# Patient Record
Sex: FEMALE | Race: WHITE | URBAN - METROPOLITAN AREA
[De-identification: names, ages, dates, MRNs, and addresses within clinical notes are randomized per-mention and may not be internally consistent; named-entity substitution may affect disease eponyms.]

---

## 2017-08-14 ENCOUNTER — IMPORTED ENCOUNTER (OUTPATIENT)
Dept: URBAN - METROPOLITAN AREA CLINIC 50 | Facility: CLINIC | Age: 77
End: 2017-08-14

## 2017-08-14 NOTE — PATIENT DISCUSSION
"""Recommended warm compresses 2-4 times a day.  Patient instructed to test temperature of the warm compress before applying to skin to avoid burns."""

## 2017-10-03 ENCOUNTER — IMPORTED ENCOUNTER (OUTPATIENT)
Dept: URBAN - METROPOLITAN AREA CLINIC 50 | Facility: CLINIC | Age: 77
End: 2017-10-03

## 2017-10-16 ENCOUNTER — IMPORTED ENCOUNTER (OUTPATIENT)
Dept: URBAN - METROPOLITAN AREA CLINIC 50 | Facility: CLINIC | Age: 77
End: 2017-10-16

## 2017-10-25 ENCOUNTER — IMPORTED ENCOUNTER (OUTPATIENT)
Dept: URBAN - METROPOLITAN AREA CLINIC 50 | Facility: CLINIC | Age: 77
End: 2017-10-25

## 2017-10-25 NOTE — PATIENT DISCUSSION
"""S/P IOL OD: Acrysof SN6AT4 19.0 (ORA) +ORA/LenSx/Arcs +TM/Omidria. Continue post operative instructions and drops per schedule.  """

## 2017-11-03 ENCOUNTER — IMPORTED ENCOUNTER (OUTPATIENT)
Dept: URBAN - METROPOLITAN AREA CLINIC 50 | Facility: CLINIC | Age: 77
End: 2017-11-03

## 2017-11-08 ENCOUNTER — IMPORTED ENCOUNTER (OUTPATIENT)
Dept: URBAN - METROPOLITAN AREA CLINIC 50 | Facility: CLINIC | Age: 77
End: 2017-11-08

## 2017-11-08 NOTE — PATIENT DISCUSSION
"""S/P IOL OS: AcrySof SN60WF +20.5 +LenSx/Arcs +TM/Omidria. Continue post operative instructions and drops per schedule.  """

## 2017-11-17 ENCOUNTER — IMPORTED ENCOUNTER (OUTPATIENT)
Dept: URBAN - METROPOLITAN AREA CLINIC 50 | Facility: CLINIC | Age: 77
End: 2017-11-17

## 2017-12-15 ENCOUNTER — IMPORTED ENCOUNTER (OUTPATIENT)
Dept: URBAN - METROPOLITAN AREA CLINIC 50 | Facility: CLINIC | Age: 77
End: 2017-12-15

## 2017-12-15 NOTE — PATIENT DISCUSSION
"""S/P IOL OU: OD: Acrysof SN6AT4 19.0 (ORA) +ORALenSx/Arcs +TMOmidria. OS: AcrySof SN60WF +20.5/Arcs +TM. MRx given today. "

## 2017-12-29 ENCOUNTER — IMPORTED ENCOUNTER (OUTPATIENT)
Dept: URBAN - METROPOLITAN AREA CLINIC 50 | Facility: CLINIC | Age: 77
End: 2017-12-29

## 2018-01-02 ENCOUNTER — IMPORTED ENCOUNTER (OUTPATIENT)
Dept: URBAN - METROPOLITAN AREA CLINIC 50 | Facility: CLINIC | Age: 78
End: 2018-01-02

## 2018-01-02 NOTE — PATIENT DISCUSSION
"""Start Warm compresses left eye frequently. "" ""Start Tobradex Drops left eye three times a day.  """

## 2018-01-09 ENCOUNTER — IMPORTED ENCOUNTER (OUTPATIENT)
Dept: URBAN - METROPOLITAN AREA CLINIC 50 | Facility: CLINIC | Age: 78
End: 2018-01-09

## 2018-01-15 ENCOUNTER — IMPORTED ENCOUNTER (OUTPATIENT)
Dept: URBAN - METROPOLITAN AREA CLINIC 50 | Facility: CLINIC | Age: 78
End: 2018-01-15

## 2018-12-10 ENCOUNTER — IMPORTED ENCOUNTER (OUTPATIENT)
Dept: URBAN - METROPOLITAN AREA CLINIC 50 | Facility: CLINIC | Age: 78
End: 2018-12-10

## 2018-12-18 ENCOUNTER — IMPORTED ENCOUNTER (OUTPATIENT)
Dept: URBAN - METROPOLITAN AREA CLINIC 50 | Facility: CLINIC | Age: 78
End: 2018-12-18

## 2019-12-18 ENCOUNTER — IMPORTED ENCOUNTER (OUTPATIENT)
Dept: URBAN - METROPOLITAN AREA CLINIC 50 | Facility: CLINIC | Age: 79
End: 2019-12-18

## 2021-03-17 ENCOUNTER — IMPORTED ENCOUNTER (OUTPATIENT)
Dept: URBAN - METROPOLITAN AREA CLINIC 50 | Facility: CLINIC | Age: 81
End: 2021-03-17

## 2021-06-14 ASSESSMENT — TONOMETRY
OD_IOP_MMHG: 15
OS_IOP_MMHG: 14
OS_IOP_MMHG: 18
OS_IOP_MMHG: 17
OD_IOP_MMHG: 15
OS_IOP_MMHG: 15
OD_IOP_MMHG: 15
OD_IOP_MMHG: 15
OS_IOP_MMHG: 16
OS_IOP_MMHG: 15
OS_IOP_MMHG: 15
OS_IOP_MMHG: 16
OD_IOP_MMHG: 30
OD_IOP_MMHG: 13
OD_IOP_MMHG: 15
OD_IOP_MMHG: 15
OS_IOP_MMHG: 16
OD_IOP_MMHG: 12
OS_IOP_MMHG: 12
OS_IOP_MMHG: 12
OS_IOP_MMHG: 16
OD_IOP_MMHG: 16
OS_IOP_MMHG: 14
OS_IOP_MMHG: 15
OS_IOP_MMHG: 12
OS_IOP_MMHG: 15
OD_IOP_MMHG: 15
OD_IOP_MMHG: 14
OD_IOP_MMHG: 14
OD_IOP_MMHG: 18
OS_IOP_MMHG: 17
OS_IOP_MMHG: 16
OD_IOP_MMHG: 14
OD_IOP_MMHG: 16
OD_IOP_MMHG: 14
OD_IOP_MMHG: 16
OS_IOP_MMHG: 15
OS_IOP_MMHG: 13
OD_IOP_MMHG: 15
OS_IOP_MMHG: 48

## 2021-06-14 ASSESSMENT — PACHYMETRY
OD_CT_UM: 559
OS_CT_UM: 557
OD_CT_UM: 559
OS_CT_UM: 557
OS_CT_UM: 557
OD_CT_UM: 559
OD_CT_UM: 559
OS_CT_UM: 557
OD_CT_UM: 559
OS_CT_UM: 557
OD_CT_UM: 559
OS_CT_UM: 557
OD_CT_UM: 559

## 2021-06-14 ASSESSMENT — VISUAL ACUITY
OS_SC: 20/40
OD_SC: 20/30
OS_CC: J1+
OD_CC: 20/25
OD_CC: J1+
OD_OTHER: 20/30. 20/50.
OS_CC: J5@ 17 IN
OS_CC: 20/30
OS_CC: 20/25
OD_SC: 20/20-2
OS_SC: 20/25
OS_BAT: 20/50
OD_CC: J1@ 16 IN
OD_SC: 20/40
OD_CC: J5@ 17 IN
OS_BAT: 20/50
OS_CC: 20/70
OS_CC: J2
OS_CC: 20/40
OS_OTHER: 20/50. 20/50.
OS_CC: J1+/-
OD_CC: 20/30+3
OD_CC: J5@ 17 IN
OS_CC: J1@ 16 IN
OS_CC: 20/40-
OD_SC: 20/30
OD_CC: 20/25
OD_CC: 20/25
OD_BAT: 20/30
OD_CC: J1+@ 16 IN
OS_PH: 20/40
OD_BAT: 20/30
OS_CC: 20/50
OS_OTHER: 20/50. 20/80.
OS_CC: 20/40
OS_CC: 20/25-
OD_OTHER: 20/30. 20/30.
OD_CC: 20/25-
OS_CC: J1+@ 16 IN
OD_CC: J1+/-
OD_CC: 20/30
OS_OTHER: 20/40. 20/50.
OS_CC: 20/25
OD_BAT: 20/30
OS_CC: J5@ 17 IN
OS_BAT: 20/40
OD_OTHER: 20/30. 20/70.
OD_BAT: 20/30
OS_OTHER: 20/50. 20/50.
OD_PH: 20/25
OD_BAT: 20/40
OD_OTHER: 20/30. 20/70.
OS_BAT: 20/50
OD_CC: 20/30
OD_CC: 20/30
OD_PH: 20/25
OD_OTHER: 20/40. 20/50.
OS_CC: 20/40-
OD_CC: J2
OD_CC: 20/30
OS_SC: 20/50

## 2022-03-10 ENCOUNTER — PREPPED CHART (OUTPATIENT)
Dept: URBAN - METROPOLITAN AREA CLINIC 49 | Facility: CLINIC | Age: 82
End: 2022-03-10

## 2022-03-16 ENCOUNTER — COMPREHENSIVE EXAM (OUTPATIENT)
Dept: URBAN - METROPOLITAN AREA CLINIC 49 | Facility: CLINIC | Age: 82
End: 2022-03-16

## 2022-03-16 DIAGNOSIS — H35.363: ICD-10-CM

## 2022-03-16 DIAGNOSIS — H26.491: ICD-10-CM

## 2022-03-16 PROCEDURE — 92014 COMPRE OPH EXAM EST PT 1/>: CPT

## 2022-03-16 PROCEDURE — 92134 CPTRZ OPH DX IMG PST SGM RTA: CPT

## 2022-03-16 ASSESSMENT — TONOMETRY
OD_IOP_MMHG: 14
OS_IOP_MMHG: 14
OS_IOP_MMHG: 13
OD_IOP_MMHG: 13

## 2022-03-16 ASSESSMENT — VISUAL ACUITY
OU_CC: J1+
OD_CC: 20/30
OD_GLARE: 20/100
OD_GLARE: >20/400
OD_PH: 20/25-

## 2022-10-22 NOTE — PATIENT DISCUSSION
Possible treatments include: warm compresses with microwavable eye mask for 10 minutes and take HydroEye or 3,000 mg flaxseed or fish oil by mouth daily. Recommended artificial tears to use as needed.

## 2023-04-19 ENCOUNTER — COMPREHENSIVE EXAM (OUTPATIENT)
Dept: URBAN - METROPOLITAN AREA CLINIC 49 | Facility: CLINIC | Age: 83
End: 2023-04-19

## 2023-04-19 DIAGNOSIS — H52.4: ICD-10-CM

## 2023-04-19 DIAGNOSIS — H43.813: ICD-10-CM

## 2023-04-19 DIAGNOSIS — H26.491: ICD-10-CM

## 2023-04-19 DIAGNOSIS — H35.363: ICD-10-CM

## 2023-04-19 PROCEDURE — 92015 DETERMINE REFRACTIVE STATE: CPT

## 2023-04-19 PROCEDURE — 92134 CPTRZ OPH DX IMG PST SGM RTA: CPT

## 2023-04-19 PROCEDURE — 92014 COMPRE OPH EXAM EST PT 1/>: CPT

## 2023-04-19 ASSESSMENT — VISUAL ACUITY
OD_CC: 20/30 BLURRY
OS_CC: 20/25-
OD_GLARE: 20/30
OD_GLARE: 20/30
OU_CC: J1+ @ 14IN

## 2023-04-19 ASSESSMENT — TONOMETRY
OS_IOP_MMHG: 14
OS_IOP_MMHG: 15
OD_IOP_MMHG: 14
OD_IOP_MMHG: 15

## 2024-04-24 ENCOUNTER — COMPREHENSIVE EXAM (OUTPATIENT)
Dept: URBAN - METROPOLITAN AREA CLINIC 49 | Facility: LOCATION | Age: 84
End: 2024-04-24

## 2024-04-24 DIAGNOSIS — H35.363: ICD-10-CM

## 2024-04-24 DIAGNOSIS — H43.813: ICD-10-CM

## 2024-04-24 DIAGNOSIS — H26.491: ICD-10-CM

## 2024-04-24 DIAGNOSIS — H52.4: ICD-10-CM

## 2024-04-24 PROCEDURE — 66821 AFTER CATARACT LASER SURGERY: CPT | Mod: 57,RT

## 2024-04-24 PROCEDURE — 92134 CPTRZ OPH DX IMG PST SGM RTA: CPT

## 2024-04-24 PROCEDURE — 99214 OFFICE O/P EST MOD 30 MIN: CPT

## 2024-04-24 ASSESSMENT — TONOMETRY
OD_IOP_MMHG: 12
OS_IOP_MMHG: 14
OD_IOP_MMHG: 14
OD_IOP_MMHG: 13

## 2024-04-24 ASSESSMENT — VISUAL ACUITY
OD_GLARE: 20/40
OD_CC: 20/30+2
OS_CC: 20/25
OU_CC: J1
OD_GLARE: 20/40

## 2024-05-08 ENCOUNTER — POST-OP (OUTPATIENT)
Dept: URBAN - METROPOLITAN AREA CLINIC 49 | Facility: LOCATION | Age: 84
End: 2024-05-08

## 2024-05-08 DIAGNOSIS — Z98.890: ICD-10-CM

## 2024-05-08 DIAGNOSIS — H35.363: ICD-10-CM

## 2024-05-08 DIAGNOSIS — H43.813: ICD-10-CM

## 2024-05-08 ASSESSMENT — TONOMETRY
OS_IOP_MMHG: 15
OD_IOP_MMHG: 14
OS_IOP_MMHG: 14
OD_IOP_MMHG: 15

## 2024-05-08 ASSESSMENT — VISUAL ACUITY
OU_CC: J1 @ 16IN
OD_CC: 20/25
OU_CC: 20/25
OS_CC: 20/25

## 2025-07-30 ENCOUNTER — COMPREHENSIVE EXAM (OUTPATIENT)
Age: 85
End: 2025-07-30

## 2025-07-30 DIAGNOSIS — H43.813: ICD-10-CM

## 2025-07-30 DIAGNOSIS — H35.363: ICD-10-CM

## 2025-07-30 PROCEDURE — 99214 OFFICE O/P EST MOD 30 MIN: CPT
